# Patient Record
Sex: OTHER/UNKNOWN | Race: WHITE | NOT HISPANIC OR LATINO | Employment: FULL TIME | URBAN - METROPOLITAN AREA
[De-identification: names, ages, dates, MRNs, and addresses within clinical notes are randomized per-mention and may not be internally consistent; named-entity substitution may affect disease eponyms.]

---

## 2024-08-30 ENCOUNTER — APPOINTMENT (OUTPATIENT)
Dept: PRIMARY CARE | Facility: CLINIC | Age: 59
End: 2024-08-30
Payer: COMMERCIAL

## 2024-09-04 ENCOUNTER — APPOINTMENT (OUTPATIENT)
Dept: PRIMARY CARE | Facility: CLINIC | Age: 59
End: 2024-09-04
Payer: COMMERCIAL

## 2024-09-04 ENCOUNTER — LAB (OUTPATIENT)
Dept: LAB | Facility: LAB | Age: 59
End: 2024-09-04
Payer: COMMERCIAL

## 2024-09-04 VITALS
SYSTOLIC BLOOD PRESSURE: 122 MMHG | WEIGHT: 180 LBS | BODY MASS INDEX: 28.25 KG/M2 | OXYGEN SATURATION: 99 % | DIASTOLIC BLOOD PRESSURE: 80 MMHG | HEART RATE: 68 BPM | HEIGHT: 67 IN | TEMPERATURE: 98 F

## 2024-09-04 DIAGNOSIS — E78.2 MIXED HYPERLIPIDEMIA: ICD-10-CM

## 2024-09-04 DIAGNOSIS — Z12.5 PROSTATE CANCER SCREENING: ICD-10-CM

## 2024-09-04 DIAGNOSIS — Z13.220 LIPID SCREENING: ICD-10-CM

## 2024-09-04 DIAGNOSIS — I10 PRIMARY HYPERTENSION: ICD-10-CM

## 2024-09-04 DIAGNOSIS — Z00.00 ROUTINE GENERAL MEDICAL EXAMINATION AT A HEALTH CARE FACILITY: ICD-10-CM

## 2024-09-04 DIAGNOSIS — Z13.220 LIPID SCREENING: Primary | ICD-10-CM

## 2024-09-04 DIAGNOSIS — Z76.89 ENCOUNTER TO ESTABLISH CARE WITH NEW DOCTOR: ICD-10-CM

## 2024-09-04 LAB
ALBUMIN SERPL-MCNC: 4.6 G/DL (ref 3.5–5)
ALP BLD-CCNC: 61 U/L (ref 35–125)
ALT SERPL-CCNC: 22 U/L (ref 5–40)
ANION GAP SERPL CALC-SCNC: 9 MMOL/L
AST SERPL-CCNC: 15 U/L (ref 5–40)
BILIRUB SERPL-MCNC: 1 MG/DL (ref 0.1–1.2)
BUN SERPL-MCNC: 14 MG/DL (ref 8–25)
CALCIUM SERPL-MCNC: 9.5 MG/DL (ref 8.5–10.4)
CHLORIDE SERPL-SCNC: 103 MMOL/L (ref 97–107)
CHOLEST SERPL-MCNC: 208 MG/DL (ref 133–200)
CHOLEST/HDLC SERPL: 3.6 {RATIO}
CO2 SERPL-SCNC: 29 MMOL/L (ref 24–31)
CREAT SERPL-MCNC: 0.8 MG/DL (ref 0.4–1.6)
EGFRCR SERPLBLD CKD-EPI 2021: >90 ML/MIN/1.73M*2
ERYTHROCYTE [DISTWIDTH] IN BLOOD BY AUTOMATED COUNT: 13 % (ref 11.5–14.5)
GLUCOSE SERPL-MCNC: 111 MG/DL (ref 65–99)
HCT VFR BLD AUTO: 44.4 % (ref 41–52)
HDLC SERPL-MCNC: 58 MG/DL
HGB BLD-MCNC: 14.6 G/DL (ref 13.5–17.5)
LDLC SERPL CALC-MCNC: 132 MG/DL (ref 65–130)
MCH RBC QN AUTO: 29 PG (ref 26–34)
MCHC RBC AUTO-ENTMCNC: 32.9 G/DL (ref 32–36)
MCV RBC AUTO: 88 FL (ref 80–100)
NRBC BLD-RTO: 0 /100 WBCS (ref 0–0)
PLATELET # BLD AUTO: 208 X10*3/UL (ref 150–450)
POTASSIUM SERPL-SCNC: 4.4 MMOL/L (ref 3.4–5.1)
PROT SERPL-MCNC: 7.3 G/DL (ref 5.9–7.9)
PSA SERPL-MCNC: 1.8 NG/ML
RBC # BLD AUTO: 5.04 X10*6/UL (ref 4.5–5.9)
SODIUM SERPL-SCNC: 141 MMOL/L (ref 133–145)
TRIGL SERPL-MCNC: 92 MG/DL (ref 40–150)
WBC # BLD AUTO: 7.1 X10*3/UL (ref 4.4–11.3)

## 2024-09-04 PROCEDURE — 3079F DIAST BP 80-89 MM HG: CPT | Performed by: STUDENT IN AN ORGANIZED HEALTH CARE EDUCATION/TRAINING PROGRAM

## 2024-09-04 PROCEDURE — 80053 COMPREHEN METABOLIC PANEL: CPT

## 2024-09-04 PROCEDURE — 3008F BODY MASS INDEX DOCD: CPT | Performed by: STUDENT IN AN ORGANIZED HEALTH CARE EDUCATION/TRAINING PROGRAM

## 2024-09-04 PROCEDURE — 85027 COMPLETE CBC AUTOMATED: CPT

## 2024-09-04 PROCEDURE — 36415 COLL VENOUS BLD VENIPUNCTURE: CPT

## 2024-09-04 PROCEDURE — 84153 ASSAY OF PSA TOTAL: CPT

## 2024-09-04 PROCEDURE — 1036F TOBACCO NON-USER: CPT | Performed by: STUDENT IN AN ORGANIZED HEALTH CARE EDUCATION/TRAINING PROGRAM

## 2024-09-04 PROCEDURE — 99386 PREV VISIT NEW AGE 40-64: CPT | Performed by: STUDENT IN AN ORGANIZED HEALTH CARE EDUCATION/TRAINING PROGRAM

## 2024-09-04 PROCEDURE — 3074F SYST BP LT 130 MM HG: CPT | Performed by: STUDENT IN AN ORGANIZED HEALTH CARE EDUCATION/TRAINING PROGRAM

## 2024-09-04 PROCEDURE — 80061 LIPID PANEL: CPT

## 2024-09-04 RX ORDER — ROSUVASTATIN CALCIUM 10 MG/1
10 TABLET, COATED ORAL DAILY
Qty: 30 TABLET | Refills: 0 | Status: SHIPPED | OUTPATIENT
Start: 2024-09-04 | End: 2024-09-05 | Stop reason: SDUPTHER

## 2024-09-04 RX ORDER — VALSARTAN 80 MG/1
80 TABLET ORAL DAILY
COMMUNITY
End: 2024-09-04 | Stop reason: SDUPTHER

## 2024-09-04 RX ORDER — VALSARTAN 80 MG/1
80 TABLET ORAL DAILY
Qty: 30 TABLET | Refills: 0 | Status: SHIPPED | OUTPATIENT
Start: 2024-09-04

## 2024-09-04 RX ORDER — ROSUVASTATIN CALCIUM 10 MG/1
10 TABLET, COATED ORAL DAILY
COMMUNITY
End: 2024-09-04 | Stop reason: SDUPTHER

## 2024-09-04 ASSESSMENT — PATIENT HEALTH QUESTIONNAIRE - PHQ9
SUM OF ALL RESPONSES TO PHQ9 QUESTIONS 1 AND 2: 0
1. LITTLE INTEREST OR PLEASURE IN DOING THINGS: NOT AT ALL
2. FEELING DOWN, DEPRESSED OR HOPELESS: NOT AT ALL

## 2024-09-04 ASSESSMENT — PAIN SCALES - GENERAL: PAINLEVEL: 0-NO PAIN

## 2024-09-04 NOTE — PROGRESS NOTES
Radha Maldonado is a 59 y.o. male who is presenting for annual physical exam.     He was recently out of the country visiting his mother, at that time he was working outside in the heat to help with something around the house.  He did become ill and had a syncopal episode.  He was evaluated in the emergency department while there.  He was started on valsartan and rosuvastatin from the emergency department prior to returning to [place].  He has been taking these regularly  and he denies adverse effects from them.    Last  Visit: Never  Reported Health: Good    Dental, Vision, Hearing:  Regular dental visits: yes  - Brushes teeth 2 times per day  - Flosses? no  Vision problems: no  - Wears glasses or contacts? no  Hearing loss: yes    Immunization status:  Up to date: no    Lifestyle:  Healthy diet: yes  Regular exercise: yes  - Exercise frequency:   - Type of exercise:   Alcohol: no  Tobacco: no  Drugs: no      Colorectal Cancer Screening:  Last colonoscopy or Cologuard:  2015  Prostate Cancer Screening:  Last PSA:  never  Metabolic Screening:  Lipid profile: ordered  Glucose screen: ordered    Review of Systems  Gen: denies fever, chills, weight loss, fatigue  HEENT: denies sinus pressure, sinus congestion, runny nose, red eyes, itchy eyes, vision loss, ear pain, hearing loss, throat pain, trouble swallowing  Neck: denies neck pain, neck swelling or masses  Chest/breast: denies breast pain, breast lumps, nipple discharge  CV: denies chest pain, palpitations, fast heart rate, syncope  Resp: denies shortness of breath, cough, wheezing  GI: denies abdominal pain, nausea, diarrhea, constipation, hematochezia, melena  : denies dysuria, hematuria, vaginal/penile discharge, frequency  Endo: denies polydipsia, polyuria, heat/cold intolerance, weight change, hair thinning  Heme: denies easy bruising, easy bleeding  Neuro: denies headache, numbness, tingling, memory loss, changes in vision  MSK: denies joint pain, joint  swelling, weakness  Psych: denies suicidal ideation, homicidal ideation, depression, anxiety, mood swings  Skin: denies rashes, abnormal lesions, itching, changes in moles     Previous History  History reviewed. No pertinent past medical history.  Past Surgical History:   Procedure Laterality Date    COLONOSCOPY  2017     Social History     Tobacco Use    Smoking status: Never    Smokeless tobacco: Never   Substance Use Topics    Alcohol use: Never    Drug use: Never     No family history on file.  No Known Allergies  Current Outpatient Medications   Medication Instructions    rosuvastatin (CRESTOR) 10 mg, oral, Daily    valsartan (DIOVAN) 80 mg, oral, Daily       Physical Exam:  General: Alert and oriented, in no acute distress. Appears stated age, well-nourished, and well hydrated  HEENT:  - Head: Normocephalic and atraumatic   - Eyes: EOMI, PERRLA  - ENT: Hearing grossly intact. Mucus membranes pink and moist without lesions. Tonsils present without swelling or exudates. Good dentition. TMs gray  Neck: Supple. No stiffness. No thyromegaly or thyroid nodules  Heart: RRR. No murmurs, clicks, or rubs  Lungs: Unlabored breathing. CTAB with no crackles, wheezes, or rhonchi  Abdomen: Normal BS in all 4 quadrants. Soft, non-tender, non-distended, with no masses  Extremities: Warm and well perfused. No edema. Normal peripheral pulses  Musculoskeletal: ROM intact. Strength 5/5 in BUE and BLE. No joint swelling. Normal gait and station  Neurological: Alert and oriented. No gross neurological deficits. Normal sensation. No weakness. DTRs +2/4   Psychological: Appropriate mood and affect  Skin: No rash, abnormal lesions, cyanosis, or erythema      Assessment & Plan  Encounter to establish care with new doctor         Lipid screening    Orders:    CBC; Future    Comprehensive metabolic panel; Future    Lipid panel; Future    Prostate cancer screening    Orders:    PSA; Future    Primary hypertension    Orders:    valsartan  (Diovan) 80 mg tablet; Take 1 tablet (80 mg) by mouth once daily.    Mixed hyperlipidemia    Orders:    rosuvastatin (Crestor) 10 mg tablet; Take 1 tablet (10 mg) by mouth once daily.    Routine general medical examination at a health care facility       Patient advised he is not up-to-date on immunizations.  Declined immunizations today.    Will follow-up with patient as soon as lab results are available.  Patient would like labs by mail if possible.  To follow-up in office in 6 months if there are no changes to medications.  Reviewed and approved by EVERTON KELLY on 9/4/24 at 12:24 PM.

## 2024-09-04 NOTE — PATIENT INSTRUCTIONS
[email or web address]    Gilbert Estrada MD - contact regarding colonoscopy  [facility]  [address] [place] [address]  Appointment: [phone]

## 2024-09-05 RX ORDER — ROSUVASTATIN CALCIUM 20 MG/1
20 TABLET, COATED ORAL DAILY
Qty: 90 TABLET | Refills: 1 | Status: SHIPPED | OUTPATIENT
Start: 2024-09-05

## 2024-09-05 NOTE — RESULT ENCOUNTER NOTE
Please let patient know that lab results showed we should increase his cholesterol medication dose.  He does have a note coming in the mail that will show all the details of the labs that were done.

## 2024-09-18 ENCOUNTER — TELEPHONE (OUTPATIENT)
Dept: PRIMARY CARE | Facility: CLINIC | Age: 59
End: 2024-09-18
Payer: COMMERCIAL

## 2024-09-18 NOTE — TELEPHONE ENCOUNTER
Patient is a  and just went to get his physical for work. The doctor said there's something wrong with Patient's carotid. The doctor said there appears to be a blockage while she was listening to patient. Please advise if he should schedule an appointment with MICHELA. Call was triaged to VELMA and he advised an appointment be scheduled with him. Patient scheduled an appointment for 09-19-24.

## 2024-09-19 ENCOUNTER — OFFICE VISIT (OUTPATIENT)
Dept: PRIMARY CARE | Facility: CLINIC | Age: 59
End: 2024-09-19
Payer: COMMERCIAL

## 2024-09-19 VITALS
OXYGEN SATURATION: 98 % | SYSTOLIC BLOOD PRESSURE: 142 MMHG | TEMPERATURE: 98 F | DIASTOLIC BLOOD PRESSURE: 84 MMHG | HEART RATE: 74 BPM

## 2024-09-19 DIAGNOSIS — R09.89 BRUIT OF LEFT CAROTID ARTERY: Primary | ICD-10-CM

## 2024-09-19 PROCEDURE — 1036F TOBACCO NON-USER: CPT | Performed by: STUDENT IN AN ORGANIZED HEALTH CARE EDUCATION/TRAINING PROGRAM

## 2024-09-19 PROCEDURE — 99213 OFFICE O/P EST LOW 20 MIN: CPT | Performed by: STUDENT IN AN ORGANIZED HEALTH CARE EDUCATION/TRAINING PROGRAM

## 2024-09-19 ASSESSMENT — ENCOUNTER SYMPTOMS
POLYDIPSIA: 0
SINUS PRESSURE: 0
PALPITATIONS: 0
WHEEZING: 0
RHINORRHEA: 0
FEVER: 0
VOMITING: 0
SLEEP DISTURBANCE: 0
MYALGIAS: 0
FREQUENCY: 0
FATIGUE: 0
HEADACHES: 0
DIARRHEA: 0
CONSTIPATION: 0
DYSURIA: 0
DIZZINESS: 0
WOUND: 0
SINUS PAIN: 0
COUGH: 0
NAUSEA: 0
LIGHT-HEADEDNESS: 0
NERVOUS/ANXIOUS: 0
DYSPHORIC MOOD: 0
SHORTNESS OF BREATH: 0
ARTHRALGIAS: 0
CHILLS: 0

## 2024-09-19 ASSESSMENT — PATIENT HEALTH QUESTIONNAIRE - PHQ9
2. FEELING DOWN, DEPRESSED OR HOPELESS: NOT AT ALL
1. LITTLE INTEREST OR PLEASURE IN DOING THINGS: NOT AT ALL
SUM OF ALL RESPONSES TO PHQ9 QUESTIONS 1 AND 2: 0

## 2024-09-19 ASSESSMENT — PAIN SCALES - GENERAL: PAINLEVEL: 0-NO PAIN

## 2024-09-19 NOTE — PROGRESS NOTES
Subjective   Patient ID: Radha Maldonado is a 59 y.o. male who presents for Follow-up (Carotid artery).    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  The examiner heard bilateral carotid bruits.  Patient is not having any concerning symptoms.  He is not having any presyncope, not having any pulsatile tinnitus, not having any dizziness with positional changes of his head.        Review of Systems   Constitutional:  Negative for chills, fatigue and fever.   HENT:  Negative for congestion, hearing loss, rhinorrhea, sinus pressure, sinus pain and tinnitus.    Eyes:  Negative for visual disturbance.   Respiratory:  Negative for cough, shortness of breath and wheezing.    Cardiovascular:  Negative for chest pain, palpitations and leg swelling.   Gastrointestinal:  Negative for constipation, diarrhea, nausea and vomiting.   Endocrine: Negative for cold intolerance, heat intolerance, polydipsia and polyuria.   Genitourinary:  Negative for dysuria, frequency and urgency.   Musculoskeletal:  Negative for arthralgias and myalgias.   Skin:  Negative for pallor, rash and wound.   Neurological:  Negative for dizziness, light-headedness and headaches.   Psychiatric/Behavioral:  Negative for dysphoric mood and sleep disturbance. The patient is not nervous/anxious.        Objective     Visit Vitals  /84 (BP Location: Left arm)   Pulse 74   Temp 36.7 °C (98 °F) (Temporal)   SpO2 98%   Smoking Status Never         Physical Exam  Constitutional:       Appearance: Normal appearance.   HENT:      Head: Normocephalic and atraumatic.      Right Ear: Tympanic membrane, ear canal and external ear normal.      Left Ear: Tympanic membrane, ear canal and external ear normal.      Nose: Nose normal.      Mouth/Throat:      Mouth: Mucous membranes are moist.      Pharynx: Oropharynx is clear.   Eyes:      Extraocular Movements: Extraocular movements intact.      Conjunctiva/sclera: Conjunctivae normal.      Pupils: Pupils are  equal, round, and reactive to light.   Cardiovascular:      Rate and Rhythm: Normal rate and regular rhythm.      Pulses: Normal pulses.           Carotid pulses are  on the left side with bruit.     Heart sounds: Normal heart sounds.   Pulmonary:      Effort: Pulmonary effort is normal.      Breath sounds: Normal breath sounds.   Abdominal:      General: There is no distension.      Palpations: Abdomen is soft.      Tenderness: There is no abdominal tenderness.   Musculoskeletal:         General: Normal range of motion.   Skin:     General: Skin is warm and dry.   Neurological:      Mental Status: He is alert and oriented to person, place, and time. Mental status is at baseline.   Psychiatric:         Mood and Affect: Mood normal.         Behavior: Behavior normal.         Assessment & Plan  Bruit of left carotid artery    Orders:    Vascular US carotid artery duplex bilateral; Future  I could not appreciate any bruit of the right carotid artery.  I was able to hear a left carotid bruit.  We will obtain an ultrasound and if there is no concerning plaque that would prevent him from renewing his license, forward results showing as such.  If there is significant stenosis or blockage, we will refer to endovascular cardiology for further evaluation and management.       Reviewed and approved by EVERTON KELLY on 9/19/24 at 3:42 PM.

## 2024-09-20 ENCOUNTER — TELEPHONE (OUTPATIENT)
Dept: PRIMARY CARE | Facility: CLINIC | Age: 59
End: 2024-09-20

## 2024-09-20 ENCOUNTER — HOSPITAL ENCOUNTER (OUTPATIENT)
Dept: RADIOLOGY | Facility: CLINIC | Age: 59
Discharge: HOME | End: 2024-09-20
Payer: COMMERCIAL

## 2024-09-20 DIAGNOSIS — R09.89 BRUIT OF LEFT CAROTID ARTERY: ICD-10-CM

## 2024-09-20 PROCEDURE — 93880 EXTRACRANIAL BILAT STUDY: CPT

## 2024-09-20 NOTE — TELEPHONE ENCOUNTER
Patient would like the results of the Ultrasound of left Carotid Artery. He would like VELMA to give him a call with the results before the end of the day since it's the weekend.    Patient can be reached at [phone]

## 2024-09-20 NOTE — TELEPHONE ENCOUNTER
It hasn't been finalized by radiology yet.  This is specialized imaging so it should be reviewed by the radiologist before giving results.

## 2024-09-29 DIAGNOSIS — I10 PRIMARY HYPERTENSION: ICD-10-CM

## 2024-09-30 RX ORDER — VALSARTAN 80 MG/1
80 TABLET ORAL DAILY
Qty: 90 TABLET | Refills: 0 | Status: SHIPPED | OUTPATIENT
Start: 2024-09-30

## 2024-12-04 ENCOUNTER — TELEPHONE (OUTPATIENT)
Dept: PRIMARY CARE | Facility: CLINIC | Age: 59
End: 2024-12-04
Payer: COMMERCIAL

## 2024-12-04 DIAGNOSIS — I10 PRIMARY HYPERTENSION: ICD-10-CM

## 2024-12-04 DIAGNOSIS — E78.2 MIXED HYPERLIPIDEMIA: ICD-10-CM

## 2025-01-02 DIAGNOSIS — I10 PRIMARY HYPERTENSION: ICD-10-CM

## 2025-01-02 RX ORDER — VALSARTAN 80 MG/1
80 TABLET ORAL DAILY
Qty: 90 TABLET | Refills: 0 | Status: SHIPPED | OUTPATIENT
Start: 2025-01-02

## 2025-03-02 LAB
ALBUMIN SERPL-MCNC: 4.3 G/DL (ref 3.6–5.1)
ALBUMIN/CREAT UR: NORMAL
ALP SERPL-CCNC: 43 U/L (ref 35–144)
ALT SERPL-CCNC: 20 U/L (ref 9–46)
ANION GAP SERPL CALCULATED.4IONS-SCNC: 9 MMOL/L (CALC) (ref 7–17)
AST SERPL-CCNC: 19 U/L (ref 10–35)
BILIRUB SERPL-MCNC: 0.6 MG/DL (ref 0.2–1.2)
BUN SERPL-MCNC: 14 MG/DL (ref 7–25)
CALCIUM SERPL-MCNC: 8.5 MG/DL (ref 8.6–10.3)
CHLORIDE SERPL-SCNC: 107 MMOL/L (ref 98–110)
CHOLEST SERPL-MCNC: 155 MG/DL
CHOLEST/HDLC SERPL: 3.2 (CALC)
CO2 SERPL-SCNC: 25 MMOL/L (ref 20–32)
CREAT SERPL-MCNC: 0.8 MG/DL (ref 0.7–1.35)
CREAT UR-MCNC: NORMAL MG/DL
EGFRCR SERPLBLD CKD-EPI 2021: 100 ML/MIN/1.73M2
GLUCOSE SERPL-MCNC: 96 MG/DL (ref 65–99)
HDLC SERPL-MCNC: 49 MG/DL
LDLC SERPL CALC-MCNC: 83 MG/DL (CALC)
MICROALBUMIN UR-MCNC: NORMAL
NONHDLC SERPL-MCNC: 106 MG/DL (CALC)
POTASSIUM SERPL-SCNC: 4.5 MMOL/L (ref 3.5–5.3)
PROT SERPL-MCNC: 6.9 G/DL (ref 6.1–8.1)
SODIUM SERPL-SCNC: 141 MMOL/L (ref 135–146)
TRIGL SERPL-MCNC: 129 MG/DL

## 2025-03-03 LAB
ALBUMIN SERPL-MCNC: 4.3 G/DL (ref 3.6–5.1)
ALBUMIN/CREAT UR: 6 MG/G CREAT
ALP SERPL-CCNC: 43 U/L (ref 35–144)
ALT SERPL-CCNC: 20 U/L (ref 9–46)
ANION GAP SERPL CALCULATED.4IONS-SCNC: 9 MMOL/L (CALC) (ref 7–17)
AST SERPL-CCNC: 19 U/L (ref 10–35)
BILIRUB SERPL-MCNC: 0.6 MG/DL (ref 0.2–1.2)
BUN SERPL-MCNC: 14 MG/DL (ref 7–25)
CALCIUM SERPL-MCNC: 8.5 MG/DL (ref 8.6–10.3)
CHLORIDE SERPL-SCNC: 107 MMOL/L (ref 98–110)
CHOLEST SERPL-MCNC: 155 MG/DL
CHOLEST/HDLC SERPL: 3.2 (CALC)
CO2 SERPL-SCNC: 25 MMOL/L (ref 20–32)
CREAT SERPL-MCNC: 0.8 MG/DL (ref 0.7–1.35)
CREAT UR-MCNC: 82 MG/DL (ref 20–320)
EGFRCR SERPLBLD CKD-EPI 2021: 100 ML/MIN/1.73M2
GLUCOSE SERPL-MCNC: 96 MG/DL (ref 65–99)
HDLC SERPL-MCNC: 49 MG/DL
LDLC SERPL CALC-MCNC: 83 MG/DL (CALC)
MICROALBUMIN UR-MCNC: 0.5 MG/DL
NONHDLC SERPL-MCNC: 106 MG/DL (CALC)
POTASSIUM SERPL-SCNC: 4.5 MMOL/L (ref 3.5–5.3)
PROT SERPL-MCNC: 6.9 G/DL (ref 6.1–8.1)
SODIUM SERPL-SCNC: 141 MMOL/L (ref 135–146)
TRIGL SERPL-MCNC: 129 MG/DL

## 2025-03-05 ASSESSMENT — ENCOUNTER SYMPTOMS
BLURRED VISION: 0
NECK PAIN: 0
PND: 0
PALPITATIONS: 0
PALPITATIONS: 0
HYPERTENSION: 1
HEADACHES: 0
SHORTNESS OF BREATH: 0
NECK PAIN: 0
SWEATS: 0
PND: 0
BLURRED VISION: 0
ORTHOPNEA: 0
HEADACHES: 0
HYPERTENSION: 1
SWEATS: 0
SHORTNESS OF BREATH: 0
ORTHOPNEA: 0

## 2025-03-06 ENCOUNTER — APPOINTMENT (OUTPATIENT)
Dept: PRIMARY CARE | Facility: CLINIC | Age: 60
End: 2025-03-06
Payer: COMMERCIAL

## 2025-03-07 ENCOUNTER — APPOINTMENT (OUTPATIENT)
Dept: PRIMARY CARE | Facility: CLINIC | Age: 60
End: 2025-03-07
Payer: COMMERCIAL

## 2025-03-13 ENCOUNTER — APPOINTMENT (OUTPATIENT)
Dept: PRIMARY CARE | Facility: CLINIC | Age: 60
End: 2025-03-13
Payer: COMMERCIAL

## 2025-03-27 ENCOUNTER — APPOINTMENT (OUTPATIENT)
Dept: PRIMARY CARE | Facility: CLINIC | Age: 60
End: 2025-03-27
Payer: COMMERCIAL

## 2025-04-29 DIAGNOSIS — I10 PRIMARY HYPERTENSION: ICD-10-CM

## 2025-04-29 RX ORDER — VALSARTAN 80 MG/1
80 TABLET ORAL DAILY
Qty: 90 TABLET | Refills: 0 | OUTPATIENT
Start: 2025-04-29